# Patient Record
Sex: FEMALE | Race: WHITE | NOT HISPANIC OR LATINO | Employment: UNEMPLOYED | URBAN - METROPOLITAN AREA
[De-identification: names, ages, dates, MRNs, and addresses within clinical notes are randomized per-mention and may not be internally consistent; named-entity substitution may affect disease eponyms.]

---

## 2023-07-27 ENCOUNTER — HOSPITAL ENCOUNTER (OUTPATIENT)
Dept: CT IMAGING | Facility: HOSPITAL | Age: 58
Discharge: HOME | End: 2023-07-27
Admitting: INTERNAL MEDICINE
Payer: COMMERCIAL

## 2023-07-27 DIAGNOSIS — R51.9 INCREASED FREQUENCY OF HEADACHES: ICD-10-CM

## 2023-07-27 DIAGNOSIS — R63.4 LOSS OF WEIGHT: ICD-10-CM

## 2023-07-27 DIAGNOSIS — R42 DIZZINESS: ICD-10-CM

## 2023-07-27 PROCEDURE — 70470 CT HEAD/BRAIN W/O & W/DYE: CPT

## 2023-07-27 PROCEDURE — 25510000001 IOPAMIDOL PER 1 ML: Performed by: INTERNAL MEDICINE

## 2023-07-27 RX ADMIN — IOPAMIDOL 50 ML: 755 INJECTION, SOLUTION INTRAVENOUS at 09:49

## 2025-06-16 NOTE — PROGRESS NOTES
Primary Care Provider  Saige Herrera APRN     Referring Provider  BOBY Shankar     Patient or patient representative verbalized consent for the use of Ambient Listening during the visit with  BOBY Zapien for chart documentation. 6/18/2025  08:27 EDT    Chief Complaint  Cough, Shortness of Breath, Wheezing, new patient , and COPD    Subjective                History of Presenting Illness    History of Present Illness  The patient is a 60-year-old female who presents to the pulmonary clinic as a new patient for evaluation of COPD. She is accompanied by her .    She was referred by her primary care physician for COPD and emphysema. She has been under the care of Dr. Ayers for over 35 years, during which time small nodules were discovered in her lungs. These nodules were not malignant but continued to grow over several years, leading to left lower lobe wedge resection. She has no history of tuberculosis or positive TB tests. She reports no fever, chills, hemoptysis, unintentional weight loss, night sweats, chest pain, leg swelling, or cardiac issues. She experiences shortness of breath with exertion, coughing, and wheezing. She occasionally coughs up phlegm. She does not have a nebulizer machine at home and does not perform breathing treatments. She continues to follow up with Dr. Ayers. She has been on Stiolto, which she reports as ineffective.    She has a history of methamphetamine use, last used 5 years ago, but reports no history of vaping or marijuana use. She has no personal history of cancer. She has a history of smoking one pack per day since she was 20 years old and is not interested in quitting. She has worked in fast food, mortgage business, and cleaned homes, potentially exposing her to various chemicals. She has never served in the  or worked in a factory. [place] but does not actively work around tobacco. She has dogs at home and had birds over  10 years ago.    She has a history of sleep apnea but does not use a CPAP machine. She has experienced episodes of waking up gasping for air and has fatigue. She takes trazodone at night but still wakes up at 2:00 AM.    She is on Prilosec and Pepcid for reflux.    She has back issues and arthritis. She has a history of blood clots in the aorta, for which she had a stent and graft placed in 2010 or 2011. She is currently on aspirin daily. She is not on any hormone replacement therapy. She sees a cardiologist, Dr. Velasquez for high blood pressure and high cholesterol.    PAST SURGICAL HISTORY:  Stent and graft placement in aorta in 2010 or 2011.    SOCIAL HISTORY  Marital Status:   Occupations: Worked in fast food, [employer] business, and cleaned homes  Tobacco: Smokes one pack per day since age 20  Recreational Drugs: History of meth use, last used 5 years ago    FAMILY HISTORY  - Mother: Uterine cancer  - Father: Heart attack, history of lung problems    Negative for siblings and children having cancer.      Patient denies fever, chills, night sweats, swollen glands in the head and neck, unintentional weight loss, hemoptysis, purulent sputum production, dysphagia, chest pain, palpitations, chest tightness, abdominal pain, nausea, vomiting, and diarrhea. Patient denies any leg swelling, orthopnea, paroxysmal nocturnal dyspnea.  Patient is able to perform activities of daily living.        Review of Systems     Family History   Problem Relation Age of Onset    Uterine cancer Mother     Heart disease Father         Social History     Socioeconomic History    Marital status:    Tobacco Use    Smoking status: Every Day     Current packs/day: 1.00     Average packs/day: 1 pack/day for 40.5 years (40.5 ttl pk-yrs)     Types: Cigarettes     Start date: 1985     Passive exposure: Current    Smokeless tobacco: Never   Vaping Use    Vaping status: Never Used   Substance and Sexual Activity    Alcohol use: Never     Drug use: Never        Past Medical History:   Diagnosis Date    GERD (gastroesophageal reflux disease)     Hypertension         Immunization History   Administered Date(s) Administered    31-influenza Vac Quardvalent Preservativ 10/30/2015    COVID-19 (PFIZER) 12YRS+ (COMIRNATY) 02/05/2024    COVID-19 (PFIZER) Purple Cap Monovalent 08/29/2021, 09/21/2021    Fluzone  >6mos 10/05/2024    Fluzone (or Fluarix & Flulaval for VFC) >6mos 10/13/2014, 10/14/2016, 10/13/2017, 10/22/2018, 10/23/2019, 10/23/2019, 12/13/2021, 12/31/2022, 12/31/2022    Hepatitis A 12/22/2018    Influenza Injectable Mdck Pf Quad 10/29/2023    Influenza Seasonal Injectable 09/18/2009, 10/12/2011, 10/09/2012, 10/03/2013    Influenza, Unspecified 12/10/2022    Pneumococcal Conjugate 13-Valent (PCV13) 10/30/2015    Pneumococcal Polysaccharide (PPSV23) 02/06/2011    Shingrix 05/30/2025    Tdap 09/04/2015       Allergies   Allergen Reactions    Cephalexin Nausea Only and Other (See Comments)     Pt states that PO Kefzol made her pass out.          Current Outpatient Medications:     albuterol sulfate  (90 Base) MCG/ACT inhaler, Inhale 2 puffs., Disp: , Rfl:     amitriptyline (ELAVIL) 10 MG tablet, Take 1 tablet by mouth Daily., Disp: , Rfl:     aspirin 81 MG chewable tablet, CHEW AND SWALLOW 1 TABLET(81 MG) BY MOUTH EVERY DAY, Disp: , Rfl:     ATORVASTATIN CALCIUM PO, Take 20 mg by mouth., Disp: , Rfl:     baclofen (LIORESAL) 10 MG tablet, Take 1 tablet by mouth 2 (Two) Times a Day., Disp: , Rfl:     bumetanide (BUMEX) 1 MG tablet, TAKE 1 TABLET(1 MG) BY MOUTH EVERY DAY, Disp: , Rfl:     celecoxib (CeleBREX) 200 MG capsule, Take 1 capsule by mouth 2 (Two) Times a Day., Disp: , Rfl:     cholecalciferol ( Vitamin D3) 25 MCG (1000 UT) tablet, Take 1 tablet by mouth Daily., Disp: , Rfl:     famotidine (PEPCID) 40 MG tablet, Take 1 tablet by mouth Daily., Disp: , Rfl:     FLUoxetine (PROzac) 20 MG capsule, Take 1 capsule by mouth Daily., Disp: ,  "Rfl:     fluticasone (FLONASE) 50 MCG/ACT nasal spray, 2 sprays by Each Nare route Daily. Shake well before using., Disp: , Rfl:     gabapentin (NEURONTIN) 300 MG capsule, Take 1 capsule by mouth 3 (Three) Times a Day., Disp: , Rfl:     omeprazole (priLOSEC) 40 MG capsule, Take 1 capsule by mouth Daily., Disp: , Rfl:     rOPINIRole (REQUIP) 5 MG tablet, Take 1 tablet by mouth Daily., Disp: , Rfl:     silver sulfadiazine (SILVADENE, SSD) 1 % cream, Apply 1 dose topically to the appropriate area as directed., Disp: , Rfl:     traZODone (DESYREL) 100 MG tablet, Take 1 tablet by mouth Every Night., Disp: , Rfl:     valACYclovir (VALTREX) 1000 MG tablet, TAKE 2 TABLETS BY MOUTH TWICE DAILY FOR EACH BOUT OF COLD SORES, Disp: , Rfl:     albuterol (PROVENTIL) (2.5 MG/3ML) 0.083% nebulizer solution, Take 2.5 mg by nebulization 4 (Four) Times a Day As Needed for Wheezing for up to 30 days., Disp: 180 each, Rfl: 5    Budeson-Glycopyrrol-Formoterol (Breztri Aerosphere) 160-9-4.8 MCG/ACT aerosol inhaler, Inhale 2 puffs 2 (Two) Times a Day. Rinse mouth out after each use, Disp: 1 each, Rfl: 5     Objective     Physical Exam  Vital Signs:   WDWN, Alert, NAD.    HEENT:  PERRL, EOMI.  OP, nares clear, no sinus tenderness  Neck:  Supple, no JVD, no thyromegaly.  Lymph: no axillary, cervical, supraclavicular lymphadenopathy noted bilaterally  Chest:  good aeration, clear to auscultation bilaterally, tympanic to percussion bilaterally, no work of breathing noted  CV: RRR, no MGR, pulses 2+, equal.  Abd:  Soft, NT, ND, + BS, no HSM  EXT:  no clubbing, no cyanosis, no edema, no joint tenderness  Neuro:  A&Ox3, CN grossly intact, no focal deficits.  Skin: No rashes or lesions noted.    /71 (BP Location: Right arm, Patient Position: Sitting, Cuff Size: Large Adult)   Pulse 72   Temp 97.6 °F (36.4 °C) (Oral)   Resp 16   Ht 167.6 cm (66\")   Wt 73.5 kg (162 lb)   SpO2 95% Comment: room air  BMI 26.15 kg/m²         Result Review : "   I have reviewed     Results        Procedures:      No Images in the past 120 days found..      Assessment and Plan      Assessment:  Diagnoses and all orders for this visit:    1. Chronic obstructive pulmonary disease, unspecified COPD type (Primary)  -     Complete PFT - Pre & Post Bronchodilator; Future  -     6 Minute Walk Test; Future  -     CBC & Differential; Future  -     Comprehensive Metabolic Panel; Future  -     IgE Level; Future  -     Alpha - 1 - Antitrypsin Phenotype; Future  -      CT Chest Low Dose Cancer Screening WO; Future  -     Cancel: Home Sleep Study; Future  -     Home Nebulizer    2. Tobacco abuse  -     Complete PFT - Pre & Post Bronchodilator; Future  -     6 Minute Walk Test; Future  -     CBC & Differential; Future  -     Comprehensive Metabolic Panel; Future  -     IgE Level; Future  -     Alpha - 1 - Antitrypsin Phenotype; Future  -      CT Chest Low Dose Cancer Screening WO; Future  -     Cancel: Home Sleep Study; Future  -     Home Nebulizer    3. Dyspnea, unspecified type  -     Complete PFT - Pre & Post Bronchodilator; Future  -     6 Minute Walk Test; Future  -     CBC & Differential; Future  -     Comprehensive Metabolic Panel; Future  -     IgE Level; Future  -     Alpha - 1 - Antitrypsin Phenotype; Future  -      CT Chest Low Dose Cancer Screening WO; Future  -     Cancel: Home Sleep Study; Future  -     Home Nebulizer    4. Cough, unspecified type  -     Complete PFT - Pre & Post Bronchodilator; Future  -     6 Minute Walk Test; Future  -     CBC & Differential; Future  -     Comprehensive Metabolic Panel; Future  -     IgE Level; Future  -     Alpha - 1 - Antitrypsin Phenotype; Future  -      CT Chest Low Dose Cancer Screening WO; Future  -     Cancel: Home Sleep Study; Future  -     Home Nebulizer  -     Respiratory Culture - Sputum, Oropharynx; Future    5. Wheezing  -     Complete PFT - Pre & Post Bronchodilator; Future  -     6 Minute Walk Test; Future  -     CBC &  Differential; Future  -     Comprehensive Metabolic Panel; Future  -     IgE Level; Future  -     Alpha - 1 - Antitrypsin Phenotype; Future  -      CT Chest Low Dose Cancer Screening WO; Future  -     Cancel: Home Sleep Study; Future  -     Home Nebulizer    6. VANITA (obstructive sleep apnea)  -     Home Sleep Study; Future    7. Pulmonary emphysema, unspecified emphysema type  Comments:  Emphysema on CTA  of thoracic dated from 4/9/2025.    Other orders  -     Budeson-Glycopyrrol-Formoterol (Breztri Aerosphere) 160-9-4.8 MCG/ACT aerosol inhaler; Inhale 2 puffs 2 (Two) Times a Day. Rinse mouth out after each use  Dispense: 1 each; Refill: 5  -     albuterol (PROVENTIL) (2.5 MG/3ML) 0.083% nebulizer solution; Take 2.5 mg by nebulization 4 (Four) Times a Day As Needed for Wheezing for up to 30 days.  Dispense: 180 each; Refill: 5         Assessment & Plan  1. Chronic Obstructive Pulmonary Disease (COPD).  Reports shortness of breath with exertion and occasional coughing with phlegm. Currently on Stiolto but finds it ineffective. A prescription for Breztri 2 puffs twice daily has been issued, with instructions to rinse mouth post-use to prevent oral thrush.  If the Breztri prescription is not approved, an alternative steroid inhaler may be considered in addition to Stiolto.  Patient is advised that if Breztri is covered and she picks up medication from the pharmacy to stop Stiolto.  A spacer will be provided for use with the inhaler. A nebulizer machine will be provided today, along with a prescription for albuterol nebulizer solution to be used every 4 hours as needed. Albuterol inhaler prescription will also be refilled. A baseline pulmonary function test and a 6-minute walk test have been ordered. Additionally, an alpha-1 antitrypsin level and genotype, CBC, CMP, IgE level, and respiratory culture have been ordered.     Telephone number for Breztri assistance program given to the patient in the office today for  patient to contact to see if they qualify for assistance with inhaler.    Nebulizer machine given to the patient in the office today.  Demonstration/instruction how to use nebulizer machine performed in the office today.    Spacer inhalers given to the patient in the office today.    2. Lung cancer screening.   A low-dose CT scan for lung cancer screening has been ordered for the end of 07/2025.    3. Sleep Apnea.  Reports a history of sleep apnea but has not undergone a recent sleep study or used a CPAP machine. A home sleep study will be arranged to confirm the diagnosis. If the home sleep study confirms sleep apnea, a CPAP machine will be provided prior to the next visit.    4. Vaccination status: patient reports they are up-to-date with flu, pneumonia, and Covid vaccines.    5.  Smoking cessation counseling provided.  I counseled the patient on the risks of smoking. I have educated patient on the risk of diseases from using tobacco products such as lung cancer, head and neck cancer, renal cancer, heart disease, stroke, and early death. I advised patient to quit and patient is not willing to Quit Tobacco Products. Patient is advised to decrease the number of cigarettes they are smoking up until the point to where they can quit.       6.   Patient to call the office, 911, or go to the ER with new or worsening symptoms.    Follow-up  Follow-up in 08/2025, sooner if needed.              Follow Up   Return for August 2025.  Patient was given instructions and counseling regarding her condition or for health maintenance advice. Please see specific information pulled into the AVS if appropriate.

## 2025-06-16 NOTE — PATIENT INSTRUCTIONS
Chronic Obstructive Pulmonary Disease Exacerbation    Chronic obstructive pulmonary disease (COPD) is a long-term (chronic) lung problem.  When you have COPD, it can feel harder to breathe in or out.  COPD exacerbation is a flare-up of symptoms when breathing gets worse and more treatment may be needed. Without treatment, flare-ups can be life-threatening. If they happen often, your lungs can become more damaged.  What are the causes?  Not taking your usual COPD medicines as told by your health care provider.  A cold or the flu, which can cause infection in your lungs.  Being exposed to things that make your breathing worse, such as:  Smoke.  Air pollution.  Fumes.  Dust.  Allergies.  Weather changes.  What are the signs or symptoms?  Symptoms do not get better or get worse even if you take your medicines as told by your provider. Symptoms may include:  More shortness of breath. You may only be able to speak one or two words at a time.  More coughing or mucus from your lungs.  More wheezing or chest tightness.  Being more tired and having less energy.  Confusion.  How is this diagnosed?  This condition is diagnosed based on:  Symptoms that get worse.  Your medical history.  A physical exam.  You may also have tests, including:  A chest X-ray.  Blood or mucus tests.  How is this treated?  You may be able to stay home or you may need to go to the hospital. Treatment may include:  Taking medicines. These may include:  Inhalers. These have medicines in them that you breathe in. These may be more of what you already take or they may be new.  Steroids. These reduce inflammation in the airways. These may be inhaled, taken by mouth, or given in an IV.  Antibiotics. These treat infection.  Using oxygen.  Using a device to help you clear mucus.  Follow these instructions at home:  Medicines  Take your medicines only as told by your provider.  If you were given antibiotics or steroids, take them as told by your provider. Do  not stop taking them even if you start to feel better.  Lifestyle  Several times a day, wash your hands with soap and water for at least 20 seconds.  If you cannot use soap and water, use hand .  This may help keep you from getting an infection.  Avoid being around crowds or people who are sick.  Do not smoke or use any products that contain nicotine or tobacco. If you need help quitting, ask your provider.  Return to your normal activities when your provider says that it's safe.  Use breathing methods to control your stress and catch your breath.  How is this prevented?  Follow your COPD action plan. The action plan tells you what to do if you're feeling good and what to do when you start feeling worse. Discuss the plan often with your provider.  Make sure you get all the shots, also called vaccines, that your provider recommends. Ask your provider about a flu shot and a pneumonia shot.  Use oxygen therapy if told by your provider. If you need home oxygen therapy, ask your provider how often to check your oxygen level with a device called an oximeter.  Keep all follow-up visits to review your COPD action plan. Your provider will want to check on your condition often to keep you healthy and out of the hospital.  Contact a health care provider if:  Your COPD symptoms get worse.  You have a fever or chills.  You have trouble doing daily activities.  You have trouble breathing even when you are resting.  Get help right away if:  You are short of breath and cannot:  Talk in full sentences.  Do normal activities.  You have chest pain.  You feel confused.  These symptoms may be an emergency. Call 911 right away.  Do not wait to see if the symptoms will go away.  Do not drive yourself to the hospital.  This information is not intended to replace advice given to you by your health care provider. Make sure you discuss any questions you have with your health care provider.  Document Revised: 09/19/2024 Document  Reviewed: 03/04/2024  [employer] Patient Education © 2024 [employer]Managing the Challenge of Quitting Smoking  Quitting smoking is a physical and mental challenge. You may have cravings, withdrawal symptoms, and temptation to smoke. Before quitting, work with your health care provider to make a plan that can help you manage quitting. Making a plan before you quit may keep you from smoking when you have the urge to smoke while trying to quit.  How to manage lifestyle changes  Managing stress  Stress can make you want to smoke, and wanting to smoke may cause stress. It is important to find ways to manage your stress. You could try some of the following:  Practice relaxation techniques.  Breathe slowly and deeply, in through your nose and out through your mouth.  Listen to music.  Soak in a bath or take a shower.  Imagine a peaceful place or vacation.  Get some support.  Talk with family or friends about your stress.  Join a support group.  Talk with a counselor or therapist.  Get some physical activity.  Go for a walk, run, or bike ride.  Play a favorite sport.  Practice yoga.    Medicines  Talk with your health care provider about medicines that might help you deal with cravings and make quitting easier for you.  Relationships  Social situations can be difficult when you are quitting smoking. To manage this, you can:  Avoid parties and other social situations where people might be smoking.  Avoid alcohol.  Leave right away if you have the urge to smoke.  Explain to your family and friends that you are quitting smoking. Ask for support and let them know you might be a bit grumpy.  Plan activities where smoking is not an option.  General instructions  Be aware that many people gain weight after they quit smoking. However, not everyone does. To keep from gaining weight, have a plan in place before you quit, and stick to the plan after you quit. Your plan should include:  Eating healthy snacks. When you have a craving,  it may help to:  Eat popcorn, or try carrots, celery, or other cut vegetables.  Chew sugar-free gum.  Changing how you eat.  Eat small portion sizes at meals.  Eat 4-6 small meals throughout the day instead of 1-2 large meals a day.  Be mindful when you eat. You should avoid watching television or doing other things that might distract you as you eat.  Exercising regularly.  Make time to exercise each day. If you do not have time for a long workout, do short bouts of exercise for 5-10 minutes several times a day.  Do some form of strengthening exercise, such as weight lifting.  Do some exercise that gets your heart beating and causes you to breathe deeply, such as walking fast, running, swimming, or biking. This is very important.  Drinking plenty of water or other low-calorie or no-calorie drinks. Drink enough fluid to keep your urine pale yellow.    How to recognize withdrawal symptoms  Your body and mind may experience discomfort as you try to get used to not having nicotine in your system. These effects are called withdrawal symptoms. They may include:  Feeling hungrier than normal.  Having trouble concentrating.  Feeling irritable or restless.  Having trouble sleeping.  Feeling depressed.  Craving a cigarette.  These symptoms may surprise you, but they are normal to have when quitting smoking.  To manage withdrawal symptoms:  Avoid places, people, and activities that trigger your cravings.  Remember why you want to quit.  Get plenty of sleep.  Avoid coffee and other drinks that contain caffeine. These may worsen some of your symptoms.  How to manage cravings  Come up with a plan for how to deal with your cravings. The plan should include the following:  A definition of the specific situation you want to deal with.  An activity or action you will take to replace smoking.  A clear idea for how this action will help.  The name of someone who could help you with this.  Cravings usually last for 5-10 minutes.  Consider taking the following actions to help you with your plan to deal with cravings:  Keep your mouth busy.  Chew sugar-free gum.  Suck on hard candies or a straw.  Brush your teeth.  Keep your hands and body busy.  Change to a different activity right away.  Squeeze or play with a ball.  Do an activity or a hobby, such as making bead jewelry, practicing needlepoint, or working with wood.  Mix up your normal routine.  Take a short exercise break. Go for a quick walk, or run up and down stairs.  Focus on doing something kind or helpful for someone else.  Call a friend or family member to talk during a craving.  Join a support group.  Contact a quitline.  Where to find support  To get help or find a support group:  Call the [employer] [place]'s Smoking Quitline: 3-800-QUIT-NOW (088-8691)  Text QUIT to SmokefreeTXT: 988500  Where to find more information  Visit these websites to find more information on quitting smoking:  [place]. Department of Health and Human Services: www.smokefree.gov  American Lung Association: www.freedomfromsmoking.org  Centers for Disease Control and Prevention (CDC): www.cdc.gov  American Heart Association: www.heart.org  Contact a health care provider if:  You want to change your plan for quitting.  The medicines you are taking are not helping.  Your eating feels out of control or you cannot sleep.  You feel depressed or become very anxious.  Summary  Quitting smoking is a physical and mental challenge. You will face cravings, withdrawal symptoms, and temptation to smoke again. Preparation can help you as you go through these challenges.  Try different techniques to manage stress, handle social situations, and prevent weight gain.  You can deal with cravings by keeping your mouth busy (such as by chewing gum), keeping your hands and body busy, calling family or friends, or contacting a quitline for people who want to quit smoking.  You can deal with withdrawal symptoms by avoiding  places where people smoke, getting plenty of rest, and avoiding drinks that contain caffeine.  This information is not intended to replace advice given to you by your health care provider. Make sure you discuss any questions you have with your health care provider.  Document Revised: 12/09/2022 Document Reviewed: 12/09/2022  [employer] Patient Education © 2024 [employer]

## 2025-06-18 ENCOUNTER — OFFICE VISIT (OUTPATIENT)
Dept: PULMONOLOGY | Facility: CLINIC | Age: 60
End: 2025-06-18
Payer: COMMERCIAL

## 2025-06-18 VITALS
OXYGEN SATURATION: 95 % | TEMPERATURE: 97.6 F | HEIGHT: 66 IN | RESPIRATION RATE: 16 BRPM | WEIGHT: 162 LBS | HEART RATE: 72 BPM | SYSTOLIC BLOOD PRESSURE: 126 MMHG | DIASTOLIC BLOOD PRESSURE: 71 MMHG | BODY MASS INDEX: 26.03 KG/M2

## 2025-06-18 DIAGNOSIS — J44.9 CHRONIC OBSTRUCTIVE PULMONARY DISEASE, UNSPECIFIED COPD TYPE: Primary | ICD-10-CM

## 2025-06-18 DIAGNOSIS — R06.00 DYSPNEA, UNSPECIFIED TYPE: ICD-10-CM

## 2025-06-18 DIAGNOSIS — R06.2 WHEEZING: ICD-10-CM

## 2025-06-18 DIAGNOSIS — R05.9 COUGH, UNSPECIFIED TYPE: ICD-10-CM

## 2025-06-18 DIAGNOSIS — G47.33 OSA (OBSTRUCTIVE SLEEP APNEA): ICD-10-CM

## 2025-06-18 DIAGNOSIS — J43.9 PULMONARY EMPHYSEMA, UNSPECIFIED EMPHYSEMA TYPE: ICD-10-CM

## 2025-06-18 DIAGNOSIS — Z72.0 TOBACCO ABUSE: ICD-10-CM

## 2025-06-18 RX ORDER — ALBUTEROL SULFATE 0.83 MG/ML
2.5 SOLUTION RESPIRATORY (INHALATION) 4 TIMES DAILY PRN
Qty: 180 EACH | Refills: 5 | Status: SHIPPED | OUTPATIENT
Start: 2025-06-18 | End: 2025-07-18

## 2025-06-18 RX ORDER — BUDESONIDE, GLYCOPYRROLATE, AND FORMOTEROL FUMARATE 160; 9; 4.8 UG/1; UG/1; UG/1
2 AEROSOL, METERED RESPIRATORY (INHALATION) 2 TIMES DAILY
Qty: 1 EACH | Refills: 5 | Status: SHIPPED | OUTPATIENT
Start: 2025-06-18

## 2025-06-25 DIAGNOSIS — G47.33 OSA (OBSTRUCTIVE SLEEP APNEA): Primary | ICD-10-CM

## 2025-07-22 ENCOUNTER — HOSPITAL ENCOUNTER (OUTPATIENT)
Dept: SLEEP MEDICINE | Facility: HOSPITAL | Age: 60
Discharge: HOME | End: 2025-07-22
Admitting: NURSE PRACTITIONER
Payer: COMMERCIAL

## 2025-07-22 DIAGNOSIS — G47.33 OSA (OBSTRUCTIVE SLEEP APNEA): ICD-10-CM

## 2025-07-22 PROCEDURE — G0399 HOME SLEEP TEST/TYPE 3 PORTA: HCPCS

## 2025-07-25 ENCOUNTER — LAB (OUTPATIENT)
Facility: HOSPITAL | Age: 60
End: 2025-07-25
Payer: COMMERCIAL

## 2025-07-25 DIAGNOSIS — J44.9 CHRONIC OBSTRUCTIVE PULMONARY DISEASE, UNSPECIFIED COPD TYPE: ICD-10-CM

## 2025-07-25 DIAGNOSIS — R06.00 DYSPNEA, UNSPECIFIED TYPE: ICD-10-CM

## 2025-07-25 DIAGNOSIS — R06.2 WHEEZING: ICD-10-CM

## 2025-07-25 DIAGNOSIS — R05.9 COUGH, UNSPECIFIED TYPE: ICD-10-CM

## 2025-07-25 DIAGNOSIS — Z72.0 TOBACCO ABUSE: ICD-10-CM

## 2025-07-25 LAB
ALBUMIN SERPL-MCNC: 4 G/DL (ref 3.5–5.2)
ALBUMIN/GLOB SERPL: 1.3 G/DL
ALP SERPL-CCNC: 110 U/L (ref 39–117)
ALT SERPL W P-5'-P-CCNC: 10 U/L (ref 1–33)
ANION GAP SERPL CALCULATED.3IONS-SCNC: 9.4 MMOL/L (ref 5–15)
AST SERPL-CCNC: 14 U/L (ref 1–32)
BASOPHILS # BLD AUTO: 0.15 10*3/MM3 (ref 0–0.2)
BASOPHILS NFR BLD AUTO: 1.3 % (ref 0–1.5)
BILIRUB SERPL-MCNC: 0.2 MG/DL (ref 0–1.2)
BUN SERPL-MCNC: 6 MG/DL (ref 6–20)
BUN/CREAT SERPL: 5.7 (ref 7–25)
CALCIUM SPEC-SCNC: 9.3 MG/DL (ref 8.6–10.5)
CHLORIDE SERPL-SCNC: 101 MMOL/L (ref 98–107)
CO2 SERPL-SCNC: 26.6 MMOL/L (ref 22–29)
CREAT SERPL-MCNC: 1.06 MG/DL (ref 0.57–1)
DEPRECATED RDW RBC AUTO: 46 FL (ref 37–54)
EGFRCR SERPLBLD CKD-EPI 2021: 61 ML/MIN/1.73
EOSINOPHIL # BLD AUTO: 0.33 10*3/MM3 (ref 0–0.4)
EOSINOPHIL NFR BLD AUTO: 3 % (ref 0.3–6.2)
ERYTHROCYTE [DISTWIDTH] IN BLOOD BY AUTOMATED COUNT: 12.8 % (ref 12.3–15.4)
GLOBULIN UR ELPH-MCNC: 3 GM/DL
GLUCOSE SERPL-MCNC: 101 MG/DL (ref 65–99)
HCT VFR BLD AUTO: 45.9 % (ref 34–46.6)
HGB BLD-MCNC: 14.9 G/DL (ref 12–15.9)
IMM GRANULOCYTES # BLD AUTO: 0.04 10*3/MM3 (ref 0–0.05)
IMM GRANULOCYTES NFR BLD AUTO: 0.4 % (ref 0–0.5)
LYMPHOCYTES # BLD AUTO: 3.53 10*3/MM3 (ref 0.7–3.1)
LYMPHOCYTES NFR BLD AUTO: 31.6 % (ref 19.6–45.3)
MCH RBC QN AUTO: 32 PG (ref 26.6–33)
MCHC RBC AUTO-ENTMCNC: 32.5 G/DL (ref 31.5–35.7)
MCV RBC AUTO: 98.5 FL (ref 79–97)
MONOCYTES # BLD AUTO: 0.72 10*3/MM3 (ref 0.1–0.9)
MONOCYTES NFR BLD AUTO: 6.5 % (ref 5–12)
NEUTROPHILS NFR BLD AUTO: 57.2 % (ref 42.7–76)
NEUTROPHILS NFR BLD AUTO: 6.39 10*3/MM3 (ref 1.7–7)
NRBC BLD AUTO-RTO: 0 /100 WBC (ref 0–0.2)
PLATELET # BLD AUTO: 380 10*3/MM3 (ref 140–450)
PMV BLD AUTO: 9.8 FL (ref 6–12)
POTASSIUM SERPL-SCNC: 4.5 MMOL/L (ref 3.5–5.2)
PROT SERPL-MCNC: 7 G/DL (ref 6–8.5)
RBC # BLD AUTO: 4.66 10*6/MM3 (ref 3.77–5.28)
SODIUM SERPL-SCNC: 137 MMOL/L (ref 136–145)
WBC NRBC COR # BLD AUTO: 11.16 10*3/MM3 (ref 3.4–10.8)

## 2025-07-25 PROCEDURE — 82103 ALPHA-1-ANTITRYPSIN TOTAL: CPT

## 2025-07-25 PROCEDURE — 85025 COMPLETE CBC W/AUTO DIFF WBC: CPT

## 2025-07-25 PROCEDURE — 82104 ALPHA-1-ANTITRYPSIN PHENO: CPT

## 2025-07-25 PROCEDURE — 36415 COLL VENOUS BLD VENIPUNCTURE: CPT

## 2025-07-25 PROCEDURE — 82785 ASSAY OF IGE: CPT

## 2025-07-25 PROCEDURE — 80053 COMPREHEN METABOLIC PANEL: CPT

## 2025-07-28 ENCOUNTER — TELEPHONE (OUTPATIENT)
Dept: PULMONOLOGY | Facility: CLINIC | Age: 60
End: 2025-07-28
Payer: COMMERCIAL

## 2025-07-30 LAB
A1AT PHENOTYP SERPL IFE: NORMAL
A1AT SERPL-MCNC: 165 MG/DL (ref 101–187)
IGE SERPL-ACNC: 40 IU/ML (ref 6–495)